# Patient Record
Sex: FEMALE | URBAN - METROPOLITAN AREA
[De-identification: names, ages, dates, MRNs, and addresses within clinical notes are randomized per-mention and may not be internally consistent; named-entity substitution may affect disease eponyms.]

---

## 2020-04-04 ENCOUNTER — COMMUNICATION - HEALTHEAST (OUTPATIENT)
Dept: SCHEDULING | Facility: CLINIC | Age: 50
End: 2020-04-04

## 2021-06-07 NOTE — TELEPHONE ENCOUNTER
Pt is calling in about an allergic reaction she had about a half hour ago after running. Pt has a history of exercise induced allergic reactions. Pt needed to give herself an epinephrine shot. Pt felt her heart racing, and flushed cheeks after the shot, but feels fine now.   Pt denies any difficulty breathing, swallowing, tongue, throat or facial swelling.   Home care measures discussed, and per protocol pt should go to the ER, but does not want to go around sick people, so is going to monitor herself at home.   Advised pt to call 911 if she develops difficulty breathing or swallowing, or any throat or tongue swelling.     Felipe Aguero, RN [facility] Triage/Medication Refill    Reason for Disposition    [1] Gave epinephrine shot AND [2] no symptoms now    Protocols used: [id number]